# Patient Record
Sex: FEMALE | Race: WHITE | NOT HISPANIC OR LATINO
[De-identification: names, ages, dates, MRNs, and addresses within clinical notes are randomized per-mention and may not be internally consistent; named-entity substitution may affect disease eponyms.]

---

## 2019-02-04 ENCOUNTER — FORM ENCOUNTER (OUTPATIENT)
Age: 54
End: 2019-02-04

## 2020-02-11 ENCOUNTER — FORM ENCOUNTER (OUTPATIENT)
Age: 55
End: 2020-02-11

## 2020-02-17 ENCOUNTER — FORM ENCOUNTER (OUTPATIENT)
Age: 55
End: 2020-02-17

## 2021-01-28 ENCOUNTER — TRANSCRIPTION ENCOUNTER (OUTPATIENT)
Age: 56
End: 2021-01-28

## 2021-02-18 ENCOUNTER — TRANSCRIPTION ENCOUNTER (OUTPATIENT)
Age: 56
End: 2021-02-18

## 2021-04-05 DIAGNOSIS — Z80.8 FAMILY HISTORY OF MALIGNANT NEOPLASM OF OTHER ORGANS OR SYSTEMS: ICD-10-CM

## 2021-04-05 DIAGNOSIS — Z87.448 PERSONAL HISTORY OF OTHER DISEASES OF URINARY SYSTEM: ICD-10-CM

## 2021-04-05 DIAGNOSIS — R32 UNSPECIFIED URINARY INCONTINENCE: ICD-10-CM

## 2021-04-05 DIAGNOSIS — Z87.42 PERSONAL HISTORY OF OTHER DISEASES OF THE FEMALE GENITAL TRACT: ICD-10-CM

## 2021-04-05 DIAGNOSIS — Z87.898 PERSONAL HISTORY OF OTHER SPECIFIED CONDITIONS: ICD-10-CM

## 2021-04-05 DIAGNOSIS — N95.2 POSTMENOPAUSAL ATROPHIC VAGINITIS: ICD-10-CM

## 2021-04-05 DIAGNOSIS — N76.0 ACUTE VAGINITIS: ICD-10-CM

## 2021-04-05 PROBLEM — Z00.00 ENCOUNTER FOR PREVENTIVE HEALTH EXAMINATION: Status: ACTIVE | Noted: 2021-04-05

## 2021-04-05 LAB — CYTOLOGY CVX/VAG DOC THIN PREP: NEGATIVE

## 2021-04-22 ENCOUNTER — APPOINTMENT (OUTPATIENT)
Dept: OBGYN | Facility: CLINIC | Age: 56
End: 2021-04-22
Payer: COMMERCIAL

## 2021-04-22 VITALS
HEIGHT: 65 IN | WEIGHT: 175 LBS | DIASTOLIC BLOOD PRESSURE: 79 MMHG | HEART RATE: 74 BPM | BODY MASS INDEX: 29.16 KG/M2 | SYSTOLIC BLOOD PRESSURE: 121 MMHG

## 2021-04-22 DIAGNOSIS — N89.8 OTHER SPECIFIED NONINFLAMMATORY DISORDERS OF VAGINA: ICD-10-CM

## 2021-04-22 DIAGNOSIS — N95.9 UNSPECIFIED MENOPAUSAL AND PERIMENOPAUSAL DISORDER: ICD-10-CM

## 2021-04-22 DIAGNOSIS — Z01.411 ENCOUNTER FOR GYNECOLOGICAL EXAMINATION (GENERAL) (ROUTINE) WITH ABNORMAL FINDINGS: ICD-10-CM

## 2021-04-22 PROCEDURE — 99072 ADDL SUPL MATRL&STAF TM PHE: CPT

## 2021-04-22 PROCEDURE — 99396 PREV VISIT EST AGE 40-64: CPT

## 2021-04-22 NOTE — REASON FOR VISIT
[Annual] : an annual visit. [Menopausal Symptoms] : menopausal symptoms [Dyspareunia] : dyspareunia [FreeTextEntry2] : annual check up no complaints

## 2021-04-22 NOTE — PHYSICAL EXAM
[Appropriately responsive] : appropriately responsive [Alert] : alert [No Acute Distress] : no acute distress [No Murmurs] : no murmurs [Soft] : soft [Non-tender] : non-tender [Non-distended] : non-distended [No HSM] : No HSM [No Lesions] : no lesions [No Mass] : no mass [Oriented x3] : oriented x3 [Examination Of The Breasts] : a normal appearance [No Masses] : no breast masses were palpable [Labia Majora] : normal [Labia Minora] : normal [Discharge] : a  ~M vaginal discharge was present [Moderate] : moderate [Yoli] : yellow [Frothy] : frothy [Normal] : normal [Uterine Adnexae] : normal [Foul Smelling] : not foul smelling

## 2021-04-22 NOTE — HISTORY OF PRESENT ILLNESS
[Patient reported mammogram was normal] : Patient reported mammogram was normal [Patient reported colonoscopy was normal] : Patient reported colonoscopy was normal [postmenopausal] : postmenopausal [Hot Flashes] : hot flashes [Night Sweats] : night sweats [Difficulty with [place]] : difficulty with intercourse [Options Discussed] : options discussed [Currently In Menopause] : currently in menopause [Yes] : Patient has concerns regarding sex [Currently Active] : currently active [Men] : men [Mammogramdate] : 02/20 [ColonoscopyDate] : 09/20 [FreeTextEntry1] : painful  "dry"

## 2021-04-22 NOTE — DISCUSSION/SUMMARY
[FreeTextEntry1] : discussed premarin cream ( vaginal estrogen) for tx of dyspareunia\par pt agrees to start \par reviewed instructions for use\par up to date with colonoscopy & mammography

## 2021-04-23 ENCOUNTER — NON-APPOINTMENT (OUTPATIENT)
Age: 56
End: 2021-04-23

## 2021-04-29 LAB
C TRACH RRNA SPEC QL NAA+PROBE: NOT DETECTED
HPV HIGH+LOW RISK DNA PNL CVX: NOT DETECTED
N GONORRHOEA RRNA SPEC QL NAA+PROBE: NOT DETECTED
SOURCE AMPLIFICATION: NORMAL

## 2021-05-10 LAB — CYTOLOGY CVX/VAG DOC THIN PREP: ABNORMAL

## 2021-06-30 ENCOUNTER — APPOINTMENT (OUTPATIENT)
Dept: OBGYN | Facility: CLINIC | Age: 56
End: 2021-06-30

## 2022-02-22 ENCOUNTER — APPOINTMENT (OUTPATIENT)
Dept: OBGYN | Facility: CLINIC | Age: 57
End: 2022-02-22
Payer: COMMERCIAL

## 2022-02-22 VITALS
TEMPERATURE: 98 F | WEIGHT: 180 LBS | DIASTOLIC BLOOD PRESSURE: 77 MMHG | HEIGHT: 65 IN | BODY MASS INDEX: 29.99 KG/M2 | HEART RATE: 76 BPM | SYSTOLIC BLOOD PRESSURE: 119 MMHG

## 2022-02-22 PROCEDURE — 99213 OFFICE O/P EST LOW 20 MIN: CPT

## 2022-02-22 NOTE — HISTORY OF PRESENT ILLNESS
[Hot Flashes] : hot flashes [Night Sweats] : night sweats [Difficulty with [place]] : difficulty with intercourse [Yes] : yes [Options Discussed] : options discussed [FreeTextEntry1] : here for fu of her dyspareunia, which was treated with vaginal estrogen cream\par was doing well before stopping x 2 weeks\par \par LAST ATTILA VISIT:\par \par    	Print\par Patient\par Name	KEVIN LI (55yo, F) ID# [id number]	Appt. Date/Time	2020 10:30AM\par 	1965	Service Dept.	[facility] OFFICE\par Provider	SID HORTON MD\par Insurance	\par Med Primary: [facility]\par Insurance # : [id number]\par Policy/Group # : [id number]\par Prescription: [facility] - Member is eligible. details\par Chief Complaint\par Well Woman Visit\par \par menopausal\par Patient's Care Team\par Primary Care Provider: ELIZABETH LEWIS MD: [address] [place] [address], Ph [phone], Fax [phone] NPI: [id number]\par Patient's Pharmacies\par [id number] [address] [address], Ph [phone], Fax [phone]\par Vitals\par 2020 10:58 am\par Ht:	5 ft 7 in\par Wt:	180 lbs\par BMI:	28.2\par BP:	121/83 standing\par Allergies\par Reviewed Allergies\par PENICILLINS	\par Medications\par Reviewed Medications\par atorvastatin 10 mg tablet\par 19   filled	[facility]\par azithromycin 250 mg tablet\par 11/15/18   filled	[facility]\par betamethasone, augmented 0.05 % topical cream\par 19   filled	[facility]\par cefdinir 300 mg capsule\par 11/15/16   filled	[facility]\par chlorhexidine gluconate 0.12 % mouthwash\par 17   filled	[facility]\par ciprofloxacin 500 mg tablet\par Take 1 tablet(s) every 12 hours by oral route for 5 days.\par 10/31/16   filled	[facility]\par clarithromycin 500 mg tablet\par 16   filled	[facility]\par clobetasoL 0.05 % topical cream\par 19   filled	[facility]\par clotrimazole-betamethasone 1 %-0.05 % topical cream\par Apply 2 g twice a day by topical route as directed for 7 days.\par 16   filled	[facility]\par diclofenac sodium 75 mg tablet,delayed release\par 10/14/18   filled	[facility]\par dicyclomine 20 mg tablet\par 16   filled	[facility]\par fluconazole 150 mg tablet\par 16   filled	[facility]\par fluconazole 200 mg tablet\par Take 1 tablet(s) every day by oral route as directed for 3 days.\par 16   filled	[facility]\par hydrocortisone 2.5 % topical ointment\par APPLY A THIN LAYER TO THE AFFECTED AREA(S) BY TOPICAL ROUTE 2 TIMES PER DAY\par 16   filled	[facility]\par ibuprofen 600 mg tablet\par 18   filled	[facility]\par Linzess 290 mcg capsule\par 19   filled	[facility]\par mupirocin 2 % topical ointment\par 10/31/16   filled	[facility]\par naproxen 500 mg tablet\par 16   filled	[facility]\par pantoprazole 40 mg tablet,delayed release\par 10/14/15   filled	[facility]\par predniSONE 20 mg tablet\par 19   filled	[facility]\par Problems\par Reviewed Problems\par Vaginitis and vulvovaginitis\par Cystocele\par Cyst of ovary\par Atrophic vaginitis\par Urinary incontinence\par Family History\par Reviewed Family History\par Father	- Malignant neoplastic disease ( age: 57)\par - BRAIN (previously recorded as Cancer)\par Mother	- Well adult\par Social History\par Reviewed Social History\par Tobacco Smoking Status: Never smoker\par Most Recent Tobacco Use Screenin2020\par Surgical History\par Surgical History not reviewed (last reviewed 10/25/2017)\par Dilation and Curettage\par Endometrial Ablation\par GYN History\par Reviewed GYN History\par LMP: Approximate.\par Date of LMP: (Notes: [facility] 3/2013).\par Obstetric History\par Reviewed Obstetric History\par TOTAL	FULL	PRE	AB. I	AB. S	ECTOPICS	MULTIPLE	LIVING\par 3							3\par 22/19/15\par Past Medical History\par Past Medical History not reviewed (last reviewed 10/20/2016)\par Screening\par None recorded.\par HPI\par routine gyn exam/vasomotor\par \par ROS\par Patient reports hot flashes, night sweats, and dry vaginal mucosa. She reports decreased libido and dyspareunia. She reports no fatigue, no fever, no significant weight gain, and no significant weight loss. She reports no abnormal moles and no rashes. She reports no irritation and no vision changes. She reports no hearing loss, no ear pain, no nose/sinus problems, no sore throat, no snoring, no dry mouth, and no mouth ulcers. She reports no dyspnea / shortness of breath, no cough, no sputum production, no hemoptysis, and no wheezing. She reports no chest pain, no palpitations, and no orthopnea. She reports no heartburn, no dysphagia, no nausea, no vomiting, no abdominal pain, no bowel movement changes, no diarrhea, no constipation, and no rectal bleeding. She reports no hematuria, no abnormal bleeding, no flank pain, no trouble urinating, no incontinence, no rash, no lesion, no discharge, no vaginal odor, and no vaginal itching. She reports no menstrual problems and no PMDD symptoms. She reports no muscle aches, no muscle weakness, no arthralgias/joint pain, and no back pain. She reports no headaches, no dizziness, no LOC, no weakness, no numbness, and no seizures. She reports no depression, no alcoholism, and no sleep disturbances.\par Physical Exam\par Patient is a 54-year-old female.\par \par Chaperone: Chaperone: present.\par \par Female Genitalia: Vulva: no masses, atrophy, or lesions. Bladder/Urethra: no urethral discharge or mass and normal meatus and bladder non distended. Vagina no erythema, cystocele, rectocele, abnormal vaginal discharge, or vesicle(s) or ulcers and tenderness; dry. Cervix: no discharge or cervical motion tenderness and grossly normal. Uterus: normal size and shape and midline, mobile, non-tender, and no uterine prolapse. Adnexa/Parametria: no parametrial tenderness or mass and no adnexal tenderness or ovarian mass.\par \par Breast: Inspection/Palpation: no erythema, induration, tenderness, skin changes, abnormal secretions, or distinct masses and normal nipple appearance and non tender axillary lymph nodes.\par \par Abdomen: Auscultation/Inspection/Palpation: no tenderness, hepatomegaly, splenomegaly, masses, or CVA tenderness and soft, non-distended, and normal bowel sounds. Hernia: none palpated.\par Assessment / Plan\par 1. Gynecologic examination -\par MAMMO NL\par \par Z01.411: Encounter for gynecological examination (general) (routine) with abnormal findings\par PAP, LB\par MAMMO, SCREENING, BILATERAL\par \par 2. Menopausal and postmenopausal disorders -\par PAMPHLET GIVEN\par \par RE-CONSIDER BIOIDENTICALS\par \par [facility]\par \par N95.9: Unspecified menopausal and perimenopausal disorder\par \par 3. Urinary incontinence -\par MILD\par \par R32: Unspecified urinary incontinence\par \par \par Return to Office\par None recorded.\par Encounter Sign-Off\par Encounter signed-off by Sid Horton MD, 2020.\par Encounter performed and documented by Sid Horton MD\par Encounter reviewed & signed by Sid Horton MD on 2020 at 11:22am

## 2022-02-22 NOTE — PHYSICAL EXAM
[Labia Majora] : normal [Labia Minora] : normal [Normal] : normal [Uterine Adnexae] : normal [FreeTextEntry1] : IMPROVED SINCE STARTING THE ESTROGEN

## 2022-02-24 LAB — HPV HIGH+LOW RISK DNA PNL CVX: NOT DETECTED

## 2022-03-05 LAB — CYTOLOGY CVX/VAG DOC THIN PREP: ABNORMAL

## 2022-03-10 ENCOUNTER — NON-APPOINTMENT (OUTPATIENT)
Age: 57
End: 2022-03-10

## 2022-09-09 ENCOUNTER — NON-APPOINTMENT (OUTPATIENT)
Age: 57
End: 2022-09-09

## 2023-03-09 ENCOUNTER — NON-APPOINTMENT (OUTPATIENT)
Age: 58
End: 2023-03-09

## 2023-04-10 ENCOUNTER — APPOINTMENT (OUTPATIENT)
Dept: OBGYN | Facility: CLINIC | Age: 58
End: 2023-04-10
Payer: COMMERCIAL

## 2023-04-10 VITALS
BODY MASS INDEX: 28.32 KG/M2 | SYSTOLIC BLOOD PRESSURE: 116 MMHG | HEIGHT: 65 IN | WEIGHT: 170 LBS | TEMPERATURE: 98 F | DIASTOLIC BLOOD PRESSURE: 77 MMHG | HEART RATE: 76 BPM

## 2023-04-10 DIAGNOSIS — Z12.39 ENCOUNTER FOR OTHER SCREENING FOR MALIGNANT NEOPLASM OF BREAST: ICD-10-CM

## 2023-04-10 DIAGNOSIS — R92.2 INCONCLUSIVE MAMMOGRAM: ICD-10-CM

## 2023-04-10 DIAGNOSIS — N94.11 SUPERFICIAL (INTROITAL) DYSPAREUNIA: ICD-10-CM

## 2023-04-10 LAB
BILIRUB UR QL STRIP: NEGATIVE
CLARITY UR: CLEAR
COLLECTION METHOD: NORMAL
GLUCOSE UR-MCNC: NEGATIVE
HCG UR QL: 0.2 EU/DL
HGB UR QL STRIP.AUTO: NEGATIVE
KETONES UR-MCNC: NEGATIVE
LEUKOCYTE ESTERASE UR QL STRIP: NEGATIVE
NITRITE UR QL STRIP: NEGATIVE
PH UR STRIP: 5.5
PROT UR STRIP-MCNC: NEGATIVE
SP GR UR STRIP: 1.01

## 2023-04-10 PROCEDURE — 81003 URINALYSIS AUTO W/O SCOPE: CPT | Mod: NC,QW

## 2023-04-10 PROCEDURE — 99396 PREV VISIT EST AGE 40-64: CPT

## 2023-04-10 RX ORDER — CONJUGATED ESTROGENS 0.62 MG/G
0.62 CREAM VAGINAL
Qty: 1 | Refills: 0 | Status: COMPLETED | COMMUNITY
Start: 2021-04-22 | End: 2023-04-10

## 2023-04-10 RX ORDER — CLOTRIMAZOLE AND BETAMETHASONE DIPROPIONATE 10; .5 MG/G; MG/G
1-0.05 CREAM TOPICAL
Qty: 1 | Refills: 0 | Status: COMPLETED | COMMUNITY
Start: 2021-04-22 | End: 2023-04-10

## 2023-04-10 RX ORDER — CLOBETASOL PROPIONATE 0.5 MG/G
0.05 CREAM TOPICAL
Refills: 0 | Status: COMPLETED | COMMUNITY
End: 2023-04-10

## 2023-04-10 RX ORDER — ATORVASTATIN CALCIUM 10 MG/1
10 TABLET, FILM COATED ORAL
Refills: 0 | Status: COMPLETED | COMMUNITY
End: 2023-04-10

## 2023-04-10 RX ORDER — BETAMETHASONE DIPROPIONATE 0.5 MG/G
0.05 CREAM, AUGMENTED TOPICAL
Refills: 0 | Status: COMPLETED | COMMUNITY
End: 2023-04-10

## 2023-04-10 NOTE — PHYSICAL EXAM
[Chaperone Present] : A chaperone was present in the examining room during all aspects of the physical examination [Appropriately responsive] : appropriately responsive [Alert] : alert [No Acute Distress] : no acute distress [No Murmurs] : no murmurs [Soft] : soft [Non-tender] : non-tender [Non-distended] : non-distended [No HSM] : No HSM [No Lesions] : no lesions [No Mass] : no mass [Oriented x3] : oriented x3 [Examination Of The Breasts] : a normal appearance [No Masses] : no breast masses were palpable [Vulvar Atrophy] : vulvar atrophy [Labia Majora] : normal [Labia Minora] : normal [Atrophy] : atrophy [Cystocele] : a cystocele [Normal] : normal [Uterine Adnexae] : normal [FreeTextEntry4] : mild cystocele

## 2023-04-13 LAB — CYTOLOGY CVX/VAG DOC THIN PREP: NORMAL

## 2024-04-08 ENCOUNTER — APPOINTMENT (OUTPATIENT)
Dept: OBGYN | Facility: CLINIC | Age: 59
End: 2024-04-08

## 2024-04-23 ENCOUNTER — APPOINTMENT (OUTPATIENT)
Dept: OBGYN | Facility: CLINIC | Age: 59
End: 2024-04-23
Payer: COMMERCIAL

## 2024-04-23 VITALS
WEIGHT: 181 LBS | SYSTOLIC BLOOD PRESSURE: 123 MMHG | BODY MASS INDEX: 30.16 KG/M2 | DIASTOLIC BLOOD PRESSURE: 81 MMHG | HEIGHT: 65 IN | HEART RATE: 76 BPM

## 2024-04-23 DIAGNOSIS — Z01.419 ENCOUNTER FOR GYNECOLOGICAL EXAMINATION (GENERAL) (ROUTINE) W/OUT ABNORMAL FINDINGS: ICD-10-CM

## 2024-04-23 DIAGNOSIS — N95.1 MENOPAUSAL AND FEMALE CLIMACTERIC STATES: ICD-10-CM

## 2024-04-23 LAB
BILIRUB UR QL STRIP: NORMAL
CLARITY UR: CLEAR
COLLECTION METHOD: NORMAL
GLUCOSE UR-MCNC: NORMAL
HCG UR QL: 0.2 EU/DL
HGB UR QL STRIP.AUTO: NORMAL
KETONES UR-MCNC: NORMAL
LEUKOCYTE ESTERASE UR QL STRIP: NORMAL
NITRITE UR QL STRIP: NORMAL
PH UR STRIP: 6
PROT UR STRIP-MCNC: NORMAL
SP GR UR STRIP: 1.02

## 2024-04-23 PROCEDURE — 99459 PELVIC EXAMINATION: CPT

## 2024-04-23 PROCEDURE — 99396 PREV VISIT EST AGE 40-64: CPT

## 2024-04-23 PROCEDURE — 81003 URINALYSIS AUTO W/O SCOPE: CPT | Mod: QW

## 2024-04-23 RX ORDER — ESTRADIOL 0.1 MG/G
0.1 CREAM VAGINAL
Qty: 1 | Refills: 3 | Status: ACTIVE | COMMUNITY
Start: 2022-09-09 | End: 1900-01-01

## 2024-04-23 RX ORDER — PRAVASTATIN SODIUM 80 MG/1
TABLET ORAL
Refills: 0 | Status: ACTIVE | COMMUNITY

## 2024-04-23 RX ORDER — ESTRADIOL 0.1 MG/G
0.1 CREAM VAGINAL
Qty: 1 | Refills: 3 | Status: COMPLETED | COMMUNITY
Start: 2021-04-24 | End: 2024-04-23

## 2024-04-23 RX ORDER — LINACLOTIDE 290 UG/1
290 CAPSULE, GELATIN COATED ORAL
Refills: 0 | Status: COMPLETED | COMMUNITY
End: 2024-04-23

## 2024-04-23 NOTE — PHYSICAL EXAM
[Chaperone Present] : A chaperone was present in the examining room during all aspects of the physical examination [[id number]] : A chaperone was present during the pelvic exam. [Examination Of The Breasts] : a normal appearance [No Masses] : no breast masses were palpable [Labia Majora] : normal [Labia Minora] : normal [Normal] : normal [Uterine Adnexae] : normal

## 2024-04-27 LAB — HPV HIGH+LOW RISK DNA PNL CVX: NOT DETECTED

## 2024-05-01 LAB — CYTOLOGY CVX/VAG DOC THIN PREP: NORMAL

## 2024-05-07 ENCOUNTER — TRANSCRIPTION ENCOUNTER (OUTPATIENT)
Age: 59
End: 2024-05-07

## 2024-05-24 ENCOUNTER — OUTPATIENT (OUTPATIENT)
Dept: OUTPATIENT SERVICES | Facility: HOSPITAL | Age: 59
LOS: 1 days | End: 2024-05-24
Payer: COMMERCIAL

## 2024-05-24 ENCOUNTER — RESULT REVIEW (OUTPATIENT)
Age: 59
End: 2024-05-24

## 2024-05-24 ENCOUNTER — APPOINTMENT (OUTPATIENT)
Age: 59
End: 2024-05-24
Payer: COMMERCIAL

## 2024-05-24 DIAGNOSIS — R06.02 SHORTNESS OF BREATH: ICD-10-CM

## 2024-05-24 PROCEDURE — 93306 TTE W/DOPPLER COMPLETE: CPT | Mod: 26

## 2024-05-24 PROCEDURE — 93306 TTE W/DOPPLER COMPLETE: CPT

## 2024-05-25 DIAGNOSIS — R06.02 SHORTNESS OF BREATH: ICD-10-CM

## 2024-06-27 ENCOUNTER — APPOINTMENT (OUTPATIENT)
Age: 59
End: 2024-06-27
Payer: COMMERCIAL

## 2024-06-27 ENCOUNTER — OUTPATIENT (OUTPATIENT)
Dept: OUTPATIENT SERVICES | Facility: HOSPITAL | Age: 59
LOS: 1 days | End: 2024-06-27
Payer: COMMERCIAL

## 2024-06-27 DIAGNOSIS — R06.02 SHORTNESS OF BREATH: ICD-10-CM

## 2024-06-27 PROCEDURE — 93018 CV STRESS TEST I&R ONLY: CPT

## 2024-06-27 PROCEDURE — 93016 CV STRESS TEST SUPVJ ONLY: CPT

## 2024-06-27 PROCEDURE — 93017 CV STRESS TEST TRACING ONLY: CPT

## 2024-06-28 DIAGNOSIS — R06.02 SHORTNESS OF BREATH: ICD-10-CM

## 2025-04-29 ENCOUNTER — NON-APPOINTMENT (OUTPATIENT)
Age: 60
End: 2025-04-29

## 2025-04-29 ENCOUNTER — APPOINTMENT (OUTPATIENT)
Dept: OBGYN | Facility: CLINIC | Age: 60
End: 2025-04-29
Payer: COMMERCIAL

## 2025-04-29 VITALS
HEIGHT: 65 IN | DIASTOLIC BLOOD PRESSURE: 80 MMHG | WEIGHT: 180 LBS | HEART RATE: 79 BPM | BODY MASS INDEX: 29.99 KG/M2 | SYSTOLIC BLOOD PRESSURE: 124 MMHG

## 2025-04-29 DIAGNOSIS — Z01.419 ENCOUNTER FOR GYNECOLOGICAL EXAMINATION (GENERAL) (ROUTINE) W/OUT ABNORMAL FINDINGS: ICD-10-CM

## 2025-04-29 DIAGNOSIS — N95.1 MENOPAUSAL AND FEMALE CLIMACTERIC STATES: ICD-10-CM

## 2025-04-29 DIAGNOSIS — N94.11 SUPERFICIAL (INTROITAL) DYSPAREUNIA: ICD-10-CM

## 2025-04-29 LAB
BILIRUB UR QL STRIP: NEGATIVE
CLARITY UR: CLEAR
COLLECTION METHOD: NORMAL
GLUCOSE UR-MCNC: NEGATIVE
HCG UR QL: 0.2 EU/DL
HGB UR QL STRIP.AUTO: NEGATIVE
KETONES UR-MCNC: NEGATIVE
LEUKOCYTE ESTERASE UR QL STRIP: ABNORMAL
NITRITE UR QL STRIP: NEGATIVE
PH UR STRIP: 5.5
PROT UR STRIP-MCNC: NEGATIVE
SP GR UR STRIP: 1.02

## 2025-04-29 PROCEDURE — 81003 URINALYSIS AUTO W/O SCOPE: CPT | Mod: QW

## 2025-04-29 PROCEDURE — 99459 PELVIC EXAMINATION: CPT

## 2025-04-29 PROCEDURE — 99396 PREV VISIT EST AGE 40-64: CPT

## 2025-04-29 RX ORDER — ESTRADIOL 0.5 MG/1
0.5 TABLET ORAL
Qty: 90 | Refills: 3 | Status: ACTIVE | COMMUNITY
Start: 2025-04-29 | End: 1900-01-01

## 2025-05-09 LAB — HPV HIGH+LOW RISK DNA PNL CVX: NOT DETECTED
